# Patient Record
Sex: MALE | Race: BLACK OR AFRICAN AMERICAN | Employment: UNEMPLOYED | URBAN - METROPOLITAN AREA
[De-identification: names, ages, dates, MRNs, and addresses within clinical notes are randomized per-mention and may not be internally consistent; named-entity substitution may affect disease eponyms.]

---

## 2017-09-08 ENCOUNTER — HOSPITAL ENCOUNTER (OUTPATIENT)
Dept: LAB | Age: 32
Discharge: HOME | End: 2017-09-08
Payer: MEDICAID

## 2017-09-08 LAB
ALBUMIN SERPL-MCNC: 3.8 G/DL (ref 3.4–5)
ALBUMIN/GLOB SERPL: 1.2 {RATIO} (ref 0.8–1.7)
ALP SERPL-CCNC: 93 U/L (ref 45–117)
ALT SERPL-CCNC: 31 U/L (ref 16–61)
ANION GAP SERPL CALC-SCNC: 6 MMOL/L (ref 3–18)
AST SERPL-CCNC: 15 U/L (ref 15–37)
BILIRUB SERPL-MCNC: 0.2 MG/DL (ref 0.2–1)
BUN SERPL-MCNC: 13 MG/DL (ref 7–18)
BUN/CREAT SERPL: 12 (ref 12–20)
CALCIUM SERPL-MCNC: 8.8 MG/DL (ref 8.5–10.1)
CHLORIDE SERPL-SCNC: 106 MMOL/L (ref 100–108)
CHOLEST SERPL-MCNC: 164 MG/DL
CO2 SERPL-SCNC: 28 MMOL/L (ref 21–32)
CREAT SERPL-MCNC: 1.12 MG/DL (ref 0.6–1.3)
ERYTHROCYTE [DISTWIDTH] IN BLOOD BY AUTOMATED COUNT: 12.5 % (ref 11.6–14.5)
GLOBULIN SER CALC-MCNC: 3.3 G/DL (ref 2–4)
GLUCOSE SERPL-MCNC: 101 MG/DL (ref 74–99)
HCT VFR BLD AUTO: 43.9 % (ref 36–48)
HDLC SERPL-MCNC: 43 MG/DL (ref 40–60)
HDLC SERPL: 3.8 {RATIO} (ref 0–5)
HGB BLD-MCNC: 15.1 G/DL (ref 13–16)
LDLC SERPL CALC-MCNC: 91 MG/DL (ref 0–100)
LIPID PROFILE,FLP: ABNORMAL
MCH RBC QN AUTO: 30.7 PG (ref 24–34)
MCHC RBC AUTO-ENTMCNC: 34.4 G/DL (ref 31–37)
MCV RBC AUTO: 89.2 FL (ref 74–97)
PLATELET # BLD AUTO: 199 K/UL (ref 135–420)
PMV BLD AUTO: 11.6 FL (ref 9.2–11.8)
POTASSIUM SERPL-SCNC: 4.1 MMOL/L (ref 3.5–5.5)
PROT SERPL-MCNC: 7.1 G/DL (ref 6.4–8.2)
RBC # BLD AUTO: 4.92 M/UL (ref 4.7–5.5)
SODIUM SERPL-SCNC: 140 MMOL/L (ref 136–145)
TRIGL SERPL-MCNC: 150 MG/DL (ref ?–150)
VLDLC SERPL CALC-MCNC: 30 MG/DL
WBC # BLD AUTO: 6 K/UL (ref 4.6–13.2)

## 2017-09-08 PROCEDURE — 80053 COMPREHEN METABOLIC PANEL: CPT | Performed by: INTERNAL MEDICINE

## 2017-09-08 PROCEDURE — 85027 COMPLETE CBC AUTOMATED: CPT | Performed by: INTERNAL MEDICINE

## 2017-09-08 PROCEDURE — 36415 COLL VENOUS BLD VENIPUNCTURE: CPT | Performed by: INTERNAL MEDICINE

## 2017-09-08 PROCEDURE — 80061 LIPID PANEL: CPT | Performed by: INTERNAL MEDICINE

## 2024-10-21 SDOH — HEALTH STABILITY: PHYSICAL HEALTH: ON AVERAGE, HOW MANY DAYS PER WEEK DO YOU ENGAGE IN MODERATE TO STRENUOUS EXERCISE (LIKE A BRISK WALK)?: 2 DAYS

## 2024-10-21 SDOH — HEALTH STABILITY: PHYSICAL HEALTH: ON AVERAGE, HOW MANY MINUTES DO YOU ENGAGE IN EXERCISE AT THIS LEVEL?: 0 MIN

## 2024-10-24 ENCOUNTER — OFFICE VISIT (OUTPATIENT)
Facility: CLINIC | Age: 39
End: 2024-10-24

## 2024-10-24 ENCOUNTER — HOSPITAL ENCOUNTER (OUTPATIENT)
Facility: HOSPITAL | Age: 39
Setting detail: SPECIMEN
Discharge: HOME | End: 2024-10-27

## 2024-10-24 VITALS
OXYGEN SATURATION: 96 % | WEIGHT: 132 LBS | DIASTOLIC BLOOD PRESSURE: 74 MMHG | HEIGHT: 65 IN | RESPIRATION RATE: 16 BRPM | BODY MASS INDEX: 21.99 KG/M2 | TEMPERATURE: 98 F | SYSTOLIC BLOOD PRESSURE: 118 MMHG | HEART RATE: 88 BPM

## 2024-10-24 DIAGNOSIS — Z11.4 SCREENING FOR HUMAN IMMUNODEFICIENCY VIRUS WITHOUT PRESENCE OF RISK FACTORS: ICD-10-CM

## 2024-10-24 DIAGNOSIS — Z11.59 ENCOUNTER FOR HEPATITIS C SCREENING TEST FOR LOW RISK PATIENT: ICD-10-CM

## 2024-10-24 DIAGNOSIS — Z13.39 ADHD (ATTENTION DEFICIT HYPERACTIVITY DISORDER) EVALUATION: ICD-10-CM

## 2024-10-24 DIAGNOSIS — Z76.89 ENCOUNTER TO ESTABLISH CARE: Primary | ICD-10-CM

## 2024-10-24 DIAGNOSIS — Z76.89 ENCOUNTER TO ESTABLISH CARE: ICD-10-CM

## 2024-10-24 LAB — LABCORP SPECIMEN COLLECTION: NORMAL

## 2024-10-24 PROCEDURE — 99001 SPECIMEN HANDLING PT-LAB: CPT

## 2024-10-24 SDOH — ECONOMIC STABILITY: FOOD INSECURITY: WITHIN THE PAST 12 MONTHS, THE FOOD YOU BOUGHT JUST DIDN'T LAST AND YOU DIDN'T HAVE MONEY TO GET MORE.: NEVER TRUE

## 2024-10-24 SDOH — ECONOMIC STABILITY: INCOME INSECURITY: HOW HARD IS IT FOR YOU TO PAY FOR THE VERY BASICS LIKE FOOD, HOUSING, MEDICAL CARE, AND HEATING?: NOT HARD AT ALL

## 2024-10-24 SDOH — ECONOMIC STABILITY: FOOD INSECURITY: WITHIN THE PAST 12 MONTHS, YOU WORRIED THAT YOUR FOOD WOULD RUN OUT BEFORE YOU GOT MONEY TO BUY MORE.: NEVER TRUE

## 2024-10-24 ASSESSMENT — PATIENT HEALTH QUESTIONNAIRE - PHQ9
SUM OF ALL RESPONSES TO PHQ QUESTIONS 1-9: 0
SUM OF ALL RESPONSES TO PHQ9 QUESTIONS 1 & 2: 0
SUM OF ALL RESPONSES TO PHQ QUESTIONS 1-9: 0
SUM OF ALL RESPONSES TO PHQ QUESTIONS 1-9: 0
2. FEELING DOWN, DEPRESSED OR HOPELESS: NOT AT ALL
SUM OF ALL RESPONSES TO PHQ QUESTIONS 1-9: 0
1. LITTLE INTEREST OR PLEASURE IN DOING THINGS: NOT AT ALL

## 2024-10-24 ASSESSMENT — ANXIETY QUESTIONNAIRES
GAD7 TOTAL SCORE: 10
IF YOU CHECKED OFF ANY PROBLEMS ON THIS QUESTIONNAIRE, HOW DIFFICULT HAVE THESE PROBLEMS MADE IT FOR YOU TO DO YOUR WORK, TAKE CARE OF THINGS AT HOME, OR GET ALONG WITH OTHER PEOPLE: SOMEWHAT DIFFICULT
5. BEING SO RESTLESS THAT IT IS HARD TO SIT STILL: NOT AT ALL
6. BECOMING EASILY ANNOYED OR IRRITABLE: MORE THAN HALF THE DAYS
7. FEELING AFRAID AS IF SOMETHING AWFUL MIGHT HAPPEN: NOT AT ALL
1. FEELING NERVOUS, ANXIOUS, OR ON EDGE: MORE THAN HALF THE DAYS
4. TROUBLE RELAXING: MORE THAN HALF THE DAYS
2. NOT BEING ABLE TO STOP OR CONTROL WORRYING: MORE THAN HALF THE DAYS
3. WORRYING TOO MUCH ABOUT DIFFERENT THINGS: MORE THAN HALF THE DAYS

## 2024-10-24 ASSESSMENT — ENCOUNTER SYMPTOMS
EYE REDNESS: 0
DIARRHEA: 0
CHEST TIGHTNESS: 0
WHEEZING: 0
SHORTNESS OF BREATH: 0
COUGH: 0
ABDOMINAL PAIN: 0

## 2024-10-24 NOTE — PATIENT INSTRUCTIONS
[facility] contact number [phone].      Patient Education        A Healthy Lifestyle: Care Instructions  A healthy lifestyle can help you feel good, have more energy, and stay at a weight that's healthy for you. You can share a healthy lifestyle with your friends and family. And you can do it on your own.    Eat meals with your friends or family. You could try cooking together.   Plan activities with other people. Go for a walk with a friend, try a free online fitness class, or join a sports league.     Eat a variety of healthy foods. These include fruits, vegetables, whole grains, low-fat dairy, and lean protein.   Choose healthy portions of food. You can use the Nutrition Facts label on food packages as a guide.     Eat more fruits and vegetables. You could add vegetables to sandwiches or add fruit to cereal.   Drink water when you are thirsty. Limit soda, juice, and sports drinks.     Try to exercise most days. Aim for at least 2½ hours of exercise each week.   Keep moving. Work in the garden or take your dog on a walk. Use the stairs instead of the elevator.     If you use tobacco or nicotine, try to quit. Ask your doctor about programs and medicines to help you quit.   Limit alcohol. Men should have no more than 2 drinks a day. Women should have no more than 1. For some people, no alcohol is the best choice.   Follow-up care is a key part of your treatment and safety. Be sure to make and go to all appointments, and call your doctor if you are having problems. It's also a good idea to know your test results and keep a list of the medicines you take.  Where can you learn more?  Go to https://www.[employer].net/patientEd and enter U807 to learn more about \"A Healthy Lifestyle: Care Instructions.\"  Current as of: August 6, 2023  Content Version: 14.2  © 2024 [employer] ([place]   Care instructions adapted under license by [employer]. If you have questions about a medical condition

## 2024-10-24 NOTE — PROGRESS NOTES
[facility]  Establish care visit   10/24/2024    Ignacio Hines (: 1985) is a 38 y.o. male, here to establish care.    Chief Complaint   Patient presents with    ADHD    Establish Care    Wrist Pain     Left    Hand Pain     Left        ASSESSMENT/ PLAN  1. Encounter to establish care  VS reviewed     BMI reviewed   Appropriate healthy lifestyle modifications discussed.  All questions answered.   F/u discussed.  - CBC with Auto Differential; Future  - Comprehensive Metabolic Panel; Future    2. ADHD (attention deficit hyperactivity disorder) evaluation  Referral for eval placed. There may be a history of developmental delay but this is unclear as patient is by himself.   - External Referral To Psychiatry    3. Encounter for hepatitis C screening test for low risk patient  - Hepatitis C Antibody; Future    4. Screening for human immunodeficiency virus without presence of risk factors  - HIV Screen; Future       I have spent 45 minutes on chart review, care coordination and patient counseling regarding disease state, lifestyle modifications and/or health maintenance screening.       Return in about 1 year (around 10/24/2025).    No future appointments.        HPI  PMH of ADHD and potential developmental delay.     Is niece is present via telephone who states that he needs to get back on mental health meds and update labs. He communicates fine but is jittery, she says.     Patient is overall happy, no questions, sleeping well.     ROS  Review of Systems   Constitutional:  Negative for activity change, chills, fatigue and fever.   HENT:  Negative for congestion.    Eyes:  Negative for redness.   Respiratory:  Negative for cough, chest tightness, shortness of breath and wheezing.    Cardiovascular:  Negative for chest pain and palpitations.   Gastrointestinal:  Negative for abdominal pain and diarrhea.   Genitourinary:  Negative for difficulty urinating.   Musculoskeletal:  Negative for

## 2024-10-25 LAB
ALBUMIN SERPL-MCNC: 4.3 G/DL (ref 4.1–5.1)
ALP SERPL-CCNC: 100 IU/L (ref 44–121)
ALT SERPL-CCNC: 13 IU/L (ref 0–44)
AST SERPL-CCNC: 15 IU/L (ref 0–40)
BASOPHILS # BLD AUTO: 0 X10E3/UL (ref 0–0.2)
BASOPHILS NFR BLD AUTO: 1 %
BILIRUB SERPL-MCNC: 0.3 MG/DL (ref 0–1.2)
BUN SERPL-MCNC: 7 MG/DL (ref 6–20)
BUN/CREAT SERPL: 6 (ref 9–20)
CALCIUM SERPL-MCNC: 9.6 MG/DL (ref 8.7–10.2)
CHLORIDE SERPL-SCNC: 103 MMOL/L (ref 96–106)
CO2 SERPL-SCNC: 25 MMOL/L (ref 20–29)
CREAT SERPL-MCNC: 1.09 MG/DL (ref 0.76–1.27)
EGFRCR SERPLBLD CKD-EPI 2021: 89 ML/MIN/1.73
EOSINOPHIL # BLD AUTO: 0.2 X10E3/UL (ref 0–0.4)
EOSINOPHIL NFR BLD AUTO: 3 %
ERYTHROCYTE [DISTWIDTH] IN BLOOD BY AUTOMATED COUNT: 12.5 % (ref 11.6–15.4)
GLOBULIN SER CALC-MCNC: 2.7 G/DL (ref 1.5–4.5)
GLUCOSE SERPL-MCNC: 96 MG/DL (ref 70–99)
HCT VFR BLD AUTO: 45.6 % (ref 37.5–51)
HCV IGG SERPL QL IA: NON REACTIVE
HGB BLD-MCNC: 15 G/DL (ref 13–17.7)
HIV 1+2 AB+HIV1 P24 AG SERPL QL IA: NON REACTIVE
IMM GRANULOCYTES # BLD AUTO: 0 X10E3/UL (ref 0–0.1)
IMM GRANULOCYTES NFR BLD AUTO: 0 %
LYMPHOCYTES # BLD AUTO: 2.3 X10E3/UL (ref 0.7–3.1)
LYMPHOCYTES NFR BLD AUTO: 46 %
MCH RBC QN AUTO: 30.7 PG (ref 26.6–33)
MCHC RBC AUTO-ENTMCNC: 32.9 G/DL (ref 31.5–35.7)
MCV RBC AUTO: 93 FL (ref 79–97)
MONOCYTES # BLD AUTO: 0.4 X10E3/UL (ref 0.1–0.9)
MONOCYTES NFR BLD AUTO: 9 %
NEUTROPHILS # BLD AUTO: 2 X10E3/UL (ref 1.4–7)
NEUTROPHILS NFR BLD AUTO: 41 %
PLATELET # BLD AUTO: 217 X10E3/UL (ref 150–450)
POTASSIUM SERPL-SCNC: 4.3 MMOL/L (ref 3.5–5.2)
PROT SERPL-MCNC: 7 G/DL (ref 6–8.5)
RBC # BLD AUTO: 4.88 X10E6/UL (ref 4.14–5.8)
SODIUM SERPL-SCNC: 140 MMOL/L (ref 134–144)
WBC # BLD AUTO: 4.9 X10E3/UL (ref 3.4–10.8)